# Patient Record
Sex: FEMALE | Race: WHITE | NOT HISPANIC OR LATINO | Employment: OTHER | URBAN - METROPOLITAN AREA
[De-identification: names, ages, dates, MRNs, and addresses within clinical notes are randomized per-mention and may not be internally consistent; named-entity substitution may affect disease eponyms.]

---

## 2023-09-23 ENCOUNTER — HOSPITAL ENCOUNTER (INPATIENT)
Facility: HOSPITAL | Age: 83
LOS: 1 days | Discharge: HOME | End: 2023-09-25
Attending: EMERGENCY MEDICINE | Admitting: INTERNAL MEDICINE
Payer: COMMERCIAL

## 2023-09-23 ENCOUNTER — APPOINTMENT (EMERGENCY)
Dept: RADIOLOGY | Facility: HOSPITAL | Age: 83
End: 2023-09-23
Payer: COMMERCIAL

## 2023-09-23 DIAGNOSIS — R65.10 SIRS (SYSTEMIC INFLAMMATORY RESPONSE SYNDROME) (HCC): ICD-10-CM

## 2023-09-23 DIAGNOSIS — D72.819 LEUKOPENIA: ICD-10-CM

## 2023-09-23 DIAGNOSIS — R50.9 FEVER: Primary | ICD-10-CM

## 2023-09-23 LAB
ALBUMIN SERPL BCP-MCNC: 3.9 G/DL (ref 3.5–5)
ALP SERPL-CCNC: 55 U/L (ref 34–104)
ALT SERPL W P-5'-P-CCNC: 17 U/L (ref 7–52)
AMORPH URATE CRY URNS QL MICRO: ABNORMAL
ANION GAP SERPL CALCULATED.3IONS-SCNC: 6 MMOL/L
APTT PPP: 27 SECONDS (ref 23–37)
AST SERPL W P-5'-P-CCNC: 26 U/L (ref 13–39)
BACTERIA UR QL AUTO: ABNORMAL /HPF
BASOPHILS # BLD AUTO: 0.01 THOUSANDS/ÂΜL (ref 0–0.1)
BASOPHILS NFR BLD AUTO: 1 % (ref 0–1)
BILIRUB SERPL-MCNC: 0.44 MG/DL (ref 0.2–1)
BILIRUB UR QL STRIP: NEGATIVE
BUN SERPL-MCNC: 19 MG/DL (ref 5–25)
CALCIUM SERPL-MCNC: 9.6 MG/DL (ref 8.4–10.2)
CHLORIDE SERPL-SCNC: 101 MMOL/L (ref 96–108)
CLARITY UR: ABNORMAL
CO2 SERPL-SCNC: 27 MMOL/L (ref 21–32)
COLOR UR: YELLOW
CREAT SERPL-MCNC: 0.74 MG/DL (ref 0.6–1.3)
EOSINOPHIL # BLD AUTO: 0.04 THOUSAND/ÂΜL (ref 0–0.61)
EOSINOPHIL NFR BLD AUTO: 2 % (ref 0–6)
ERYTHROCYTE [DISTWIDTH] IN BLOOD BY AUTOMATED COUNT: 12.6 % (ref 11.6–15.1)
GFR SERPL CREATININE-BSD FRML MDRD: 74 ML/MIN/1.73SQ M
GLUCOSE SERPL-MCNC: 121 MG/DL (ref 65–140)
GLUCOSE UR STRIP-MCNC: NEGATIVE MG/DL
HCT VFR BLD AUTO: 38.6 % (ref 34.8–46.1)
HGB BLD-MCNC: 12.7 G/DL (ref 11.5–15.4)
HGB UR QL STRIP.AUTO: ABNORMAL
IMM GRANULOCYTES # BLD AUTO: 0.01 THOUSAND/UL (ref 0–0.2)
IMM GRANULOCYTES NFR BLD AUTO: 1 % (ref 0–2)
INR PPP: 1.08 (ref 0.84–1.19)
KETONES UR STRIP-MCNC: NEGATIVE MG/DL
LACTATE SERPL-SCNC: 0.9 MMOL/L (ref 0.5–2)
LEUKOCYTE ESTERASE UR QL STRIP: NEGATIVE
LYMPHOCYTES # BLD AUTO: 0.25 THOUSANDS/ÂΜL (ref 0.6–4.47)
LYMPHOCYTES NFR BLD AUTO: 13 % (ref 14–44)
MCH RBC QN AUTO: 30.1 PG (ref 26.8–34.3)
MCHC RBC AUTO-ENTMCNC: 32.9 G/DL (ref 31.4–37.4)
MCV RBC AUTO: 92 FL (ref 82–98)
MONOCYTES # BLD AUTO: 0.24 THOUSAND/ÂΜL (ref 0.17–1.22)
MONOCYTES NFR BLD AUTO: 13 % (ref 4–12)
MUCOUS THREADS UR QL AUTO: ABNORMAL
NEUTROPHILS # BLD AUTO: 1.35 THOUSANDS/ÂΜL (ref 1.85–7.62)
NEUTS SEG NFR BLD AUTO: 70 % (ref 43–75)
NITRITE UR QL STRIP: NEGATIVE
NON-SQ EPI CELLS URNS QL MICRO: ABNORMAL /HPF
NRBC BLD AUTO-RTO: 0 /100 WBCS
PH UR STRIP.AUTO: 7 [PH] (ref 4.5–8)
PLATELET # BLD AUTO: 196 THOUSANDS/UL (ref 149–390)
PMV BLD AUTO: 9.3 FL (ref 8.9–12.7)
POTASSIUM SERPL-SCNC: 4.3 MMOL/L (ref 3.5–5.3)
PROCALCITONIN SERPL-MCNC: 8.03 NG/ML
PROT SERPL-MCNC: 6.9 G/DL (ref 6.4–8.4)
PROT UR STRIP-MCNC: ABNORMAL MG/DL
PROTHROMBIN TIME: 14.2 SECONDS (ref 11.6–14.5)
RBC # BLD AUTO: 4.22 MILLION/UL (ref 3.81–5.12)
RBC #/AREA URNS AUTO: ABNORMAL /HPF
SARS-COV-2 RNA RESP QL NAA+PROBE: NEGATIVE
SODIUM SERPL-SCNC: 134 MMOL/L (ref 135–147)
SP GR UR STRIP.AUTO: 1.02 (ref 1–1.03)
UROBILINOGEN UR QL STRIP.AUTO: 0.2 E.U./DL
WBC # BLD AUTO: 1.9 THOUSAND/UL (ref 4.31–10.16)
WBC #/AREA URNS AUTO: ABNORMAL /HPF

## 2023-09-23 PROCEDURE — 85652 RBC SED RATE AUTOMATED: CPT | Performed by: INTERNAL MEDICINE

## 2023-09-23 PROCEDURE — 71045 X-RAY EXAM CHEST 1 VIEW: CPT

## 2023-09-23 PROCEDURE — 1123F ACP DISCUSS/DSCN MKR DOCD: CPT | Performed by: INTERNAL MEDICINE

## 2023-09-23 PROCEDURE — 84145 PROCALCITONIN (PCT): CPT

## 2023-09-23 PROCEDURE — 36415 COLL VENOUS BLD VENIPUNCTURE: CPT

## 2023-09-23 PROCEDURE — 99285 EMERGENCY DEPT VISIT HI MDM: CPT

## 2023-09-23 PROCEDURE — 85610 PROTHROMBIN TIME: CPT

## 2023-09-23 PROCEDURE — 83605 ASSAY OF LACTIC ACID: CPT

## 2023-09-23 PROCEDURE — 80053 COMPREHEN METABOLIC PANEL: CPT

## 2023-09-23 PROCEDURE — 87635 SARS-COV-2 COVID-19 AMP PRB: CPT

## 2023-09-23 PROCEDURE — 85025 COMPLETE CBC W/AUTO DIFF WBC: CPT

## 2023-09-23 PROCEDURE — 81001 URINALYSIS AUTO W/SCOPE: CPT

## 2023-09-23 PROCEDURE — 85730 THROMBOPLASTIN TIME PARTIAL: CPT

## 2023-09-23 PROCEDURE — 93005 ELECTROCARDIOGRAM TRACING: CPT

## 2023-09-23 PROCEDURE — 99285 EMERGENCY DEPT VISIT HI MDM: CPT | Performed by: EMERGENCY MEDICINE

## 2023-09-23 PROCEDURE — 86140 C-REACTIVE PROTEIN: CPT | Performed by: INTERNAL MEDICINE

## 2023-09-23 PROCEDURE — 87040 BLOOD CULTURE FOR BACTERIA: CPT

## 2023-09-23 PROCEDURE — 86618 LYME DISEASE ANTIBODY: CPT

## 2023-09-23 RX ORDER — ACETAMINOPHEN 325 MG/1
975 TABLET ORAL ONCE
Status: COMPLETED | OUTPATIENT
Start: 2023-09-23 | End: 2023-09-23

## 2023-09-23 RX ADMIN — ACETAMINOPHEN 975 MG: 325 TABLET, FILM COATED ORAL at 21:09

## 2023-09-24 ENCOUNTER — APPOINTMENT (INPATIENT)
Dept: RADIOLOGY | Facility: HOSPITAL | Age: 83
End: 2023-09-24
Payer: COMMERCIAL

## 2023-09-24 PROBLEM — R65.10 SIRS (SYSTEMIC INFLAMMATORY RESPONSE SYNDROME) (HCC): Status: ACTIVE | Noted: 2023-09-24

## 2023-09-24 PROBLEM — D72.819 LEUKOPENIA: Status: ACTIVE | Noted: 2023-09-24

## 2023-09-24 LAB
ANION GAP SERPL CALCULATED.3IONS-SCNC: 5 MMOL/L
ATRIAL RATE: 68 BPM
B BURGDOR IGG+IGM SER QL IA: NEGATIVE
BASOPHILS # BLD AUTO: 0.01 THOUSANDS/ÂΜL (ref 0–0.1)
BASOPHILS NFR BLD AUTO: 1 % (ref 0–1)
BUN SERPL-MCNC: 21 MG/DL (ref 5–25)
CALCIUM SERPL-MCNC: 9 MG/DL (ref 8.4–10.2)
CHLORIDE SERPL-SCNC: 103 MMOL/L (ref 96–108)
CO2 SERPL-SCNC: 28 MMOL/L (ref 21–32)
CREAT SERPL-MCNC: 0.91 MG/DL (ref 0.6–1.3)
CRP SERPL QL: 52.4 MG/L
EOSINOPHIL # BLD AUTO: 0.13 THOUSAND/ÂΜL (ref 0–0.61)
EOSINOPHIL NFR BLD AUTO: 6 % (ref 0–6)
ERYTHROCYTE [DISTWIDTH] IN BLOOD BY AUTOMATED COUNT: 12.8 % (ref 11.6–15.1)
ERYTHROCYTE [SEDIMENTATION RATE] IN BLOOD: 31 MM/HOUR (ref 0–29)
FLUAV RNA RESP QL NAA+PROBE: NEGATIVE
FLUBV RNA RESP QL NAA+PROBE: NEGATIVE
GFR SERPL CREATININE-BSD FRML MDRD: 57 ML/MIN/1.73SQ M
GLUCOSE SERPL-MCNC: 94 MG/DL (ref 65–140)
HCT VFR BLD AUTO: 38.3 % (ref 34.8–46.1)
HGB BLD-MCNC: 12.4 G/DL (ref 11.5–15.4)
IMM GRANULOCYTES # BLD AUTO: 0.01 THOUSAND/UL (ref 0–0.2)
IMM GRANULOCYTES NFR BLD AUTO: 1 % (ref 0–2)
LYMPHOCYTES # BLD AUTO: 0.85 THOUSANDS/ÂΜL (ref 0.6–4.47)
LYMPHOCYTES NFR BLD AUTO: 38 % (ref 14–44)
MCH RBC QN AUTO: 30 PG (ref 26.8–34.3)
MCHC RBC AUTO-ENTMCNC: 32.4 G/DL (ref 31.4–37.4)
MCV RBC AUTO: 93 FL (ref 82–98)
MONOCYTES # BLD AUTO: 0.41 THOUSAND/ÂΜL (ref 0.17–1.22)
MONOCYTES NFR BLD AUTO: 19 % (ref 4–12)
NEUTROPHILS # BLD AUTO: 0.75 THOUSANDS/ÂΜL (ref 1.85–7.62)
NEUTS SEG NFR BLD AUTO: 35 % (ref 43–75)
NRBC BLD AUTO-RTO: 0 /100 WBCS
P AXIS: 51 DEGREES
PLATELET # BLD AUTO: 178 THOUSANDS/UL (ref 149–390)
PMV BLD AUTO: 9.3 FL (ref 8.9–12.7)
POTASSIUM SERPL-SCNC: 3.7 MMOL/L (ref 3.5–5.3)
PR INTERVAL: 172 MS
QRS AXIS: 0 DEGREES
QRSD INTERVAL: 82 MS
QT INTERVAL: 384 MS
QTC INTERVAL: 408 MS
RBC # BLD AUTO: 4.14 MILLION/UL (ref 3.81–5.12)
RSV RNA RESP QL NAA+PROBE: NEGATIVE
SARS-COV-2 RNA RESP QL NAA+PROBE: NEGATIVE
SODIUM SERPL-SCNC: 136 MMOL/L (ref 135–147)
T WAVE AXIS: 45 DEGREES
VENTRICULAR RATE: 68 BPM
WBC # BLD AUTO: 2.16 THOUSAND/UL (ref 4.31–10.16)

## 2023-09-24 PROCEDURE — 85025 COMPLETE CBC W/AUTO DIFF WBC: CPT | Performed by: INTERNAL MEDICINE

## 2023-09-24 PROCEDURE — 71260 CT THORAX DX C+: CPT

## 2023-09-24 PROCEDURE — 74177 CT ABD & PELVIS W/CONTRAST: CPT

## 2023-09-24 PROCEDURE — G1004 CDSM NDSC: HCPCS

## 2023-09-24 PROCEDURE — 80048 BASIC METABOLIC PNL TOTAL CA: CPT | Performed by: INTERNAL MEDICINE

## 2023-09-24 PROCEDURE — 0241U HB NFCT DS VIR RESP RNA 4 TRGT: CPT | Performed by: INTERNAL MEDICINE

## 2023-09-24 PROCEDURE — 93010 ELECTROCARDIOGRAM REPORT: CPT | Performed by: INTERNAL MEDICINE

## 2023-09-24 PROCEDURE — 99223 1ST HOSP IP/OBS HIGH 75: CPT | Performed by: INTERNAL MEDICINE

## 2023-09-24 PROCEDURE — 96374 THER/PROPH/DIAG INJ IV PUSH: CPT

## 2023-09-24 RX ORDER — ZOLPIDEM TARTRATE 5 MG/1
5 TABLET ORAL
Status: DISCONTINUED | OUTPATIENT
Start: 2023-09-24 | End: 2023-09-25 | Stop reason: HOSPADM

## 2023-09-24 RX ORDER — ONDANSETRON 2 MG/ML
4 INJECTION INTRAMUSCULAR; INTRAVENOUS EVERY 6 HOURS PRN
Status: DISCONTINUED | OUTPATIENT
Start: 2023-09-24 | End: 2023-09-25 | Stop reason: HOSPADM

## 2023-09-24 RX ORDER — PHENOL 1.4 %
1 AEROSOL, SPRAY (ML) MUCOUS MEMBRANE EVERY MORNING
COMMUNITY

## 2023-09-24 RX ORDER — MELATONIN
2000 DAILY
Status: DISCONTINUED | OUTPATIENT
Start: 2023-09-24 | End: 2023-09-25 | Stop reason: HOSPADM

## 2023-09-24 RX ORDER — ENOXAPARIN SODIUM 100 MG/ML
40 INJECTION SUBCUTANEOUS DAILY
Status: DISCONTINUED | OUTPATIENT
Start: 2023-09-24 | End: 2023-09-25 | Stop reason: HOSPADM

## 2023-09-24 RX ORDER — ZOLPIDEM TARTRATE 5 MG/1
5 TABLET ORAL DAILY PRN
COMMUNITY
Start: 2023-07-26

## 2023-09-24 RX ORDER — CALCIUM CARBONATE 500(1250)
1 TABLET ORAL
Status: DISCONTINUED | OUTPATIENT
Start: 2023-09-24 | End: 2023-09-25 | Stop reason: HOSPADM

## 2023-09-24 RX ORDER — BIOTIN 1 MG
1000 TABLET ORAL
COMMUNITY

## 2023-09-24 RX ORDER — ACETAMINOPHEN 325 MG/1
650 TABLET ORAL EVERY 6 HOURS PRN
Status: DISCONTINUED | OUTPATIENT
Start: 2023-09-24 | End: 2023-09-25 | Stop reason: HOSPADM

## 2023-09-24 RX ADMIN — ZOLPIDEM TARTRATE 5 MG: 5 TABLET ORAL at 01:26

## 2023-09-24 RX ADMIN — Medication 1 TABLET: at 08:00

## 2023-09-24 RX ADMIN — Medication 2000 UNITS: at 08:00

## 2023-09-24 RX ADMIN — ZOLPIDEM TARTRATE 5 MG: 5 TABLET ORAL at 20:57

## 2023-09-24 RX ADMIN — ENOXAPARIN SODIUM 40 MG: 40 INJECTION SUBCUTANEOUS at 08:00

## 2023-09-24 RX ADMIN — ACETAMINOPHEN 650 MG: 325 TABLET, FILM COATED ORAL at 08:00

## 2023-09-24 RX ADMIN — CEFTRIAXONE SODIUM 1000 MG: 10 INJECTION, POWDER, FOR SOLUTION INTRAVENOUS at 00:09

## 2023-09-24 RX ADMIN — IOHEXOL 100 ML: 350 INJECTION, SOLUTION INTRAVENOUS at 00:49

## 2023-09-24 NOTE — ED PROVIDER NOTES
History  Chief Complaint   Patient presents with   • Fever     Pt coming from [facility] dementia unit. Reporting fever all day and body aches, fever of 103 before tylenol. 102.3 for EMS. Last had tylenol at 7am. No known sick contacts. Also c/o nausea and headache     78year-old woman with relevant past medical history of breast cancer and hyperlipidemia presents with fever and arthralgias. Patient was in her normal state of health yesterday when she awoke today with diffuse arthralgias of the upper and lower extremities. She tried taking AN Advil for the pain, but then had an episode of small vomiting which she claims is secondary to taking an empty stomach. She has not vomited since. She asked her nurse at the [facility] for Tylenol, but she never was given any. She also reports having a fever of unknown number. She is denying cough, sore throat, abdominal pain, dysuria, diarrhea, or constipation. She does go outside to golf. She last golfed on Tuesday. She did not notice any tick bites. Prior to Admission Medications   Prescriptions Last Dose Informant Patient Reported? Taking? Biotin 1000 MCG tablet   Yes No   Sig: Take 1,000 mg by mouth   Cholecalciferol 50 MCG (2000 UT) CAPS   Yes No   Sig: Take 1 capsule by mouth every morning   calcium carbonate (OS-AFTUMA) 600 MG tablet   Yes No   Sig: Take 1 tablet by mouth every morning   vitamin A 3 MG (10751 UT) capsule   Yes No   Sig: Take 10,000 Units by mouth   zolpidem (AMBIEN) 5 mg tablet   Yes Yes   Sig: Take 5 mg by mouth daily as needed      Facility-Administered Medications: None       Past Medical History:   Diagnosis Date   • Breast cancer ([address] [facility])    • Dementia ([address] [facility])    • Hyperlipidemia    • Insomnia        History reviewed. No pertinent surgical history. History reviewed. No pertinent family history. I have reviewed and agree with the history as documented.     E-Cigarette/Vaping     E-Cigarette/Vaping Substances     Social History     Tobacco Use • Smoking status: Never   • Smokeless tobacco: Never   Substance Use Topics   • Alcohol use: Never   • Drug use: Never        Review of Systems   Constitutional: Positive for fever. Negative for chills. HENT: Negative for ear pain and sore throat. Eyes: Negative for pain and visual disturbance. Respiratory: Negative for cough, shortness of breath and wheezing. Cardiovascular: Negative for chest pain and palpitations. Gastrointestinal: Negative for abdominal pain, constipation and diarrhea. Genitourinary: Negative for dysuria, frequency, hematuria and vaginal bleeding. Musculoskeletal: Positive for arthralgias. Negative for back pain. Skin: Negative for color change and rash. Neurological: Negative for seizures, syncope and headaches. Psychiatric/Behavioral: Negative for agitation and confusion. Physical Exam  ED Triage Vitals   Temperature Pulse Respirations Blood Pressure SpO2   09/23/23 2054 09/23/23 2052 09/23/23 2052 09/23/23 2054 09/23/23 2052   99.2 °F (37.3 °C) 69 18 149/65 94 %      Temp Source Heart Rate Source Patient Position - Orthostatic VS BP Location FiO2 (%)   09/23/23 2054 09/23/23 2052 09/23/23 2054 09/23/23 2054 --   Oral Monitor Lying Right arm       Pain Score       09/23/23 2230       No Pain             Orthostatic Vital Signs  Vitals:    09/23/23 2300 09/24/23 0030 09/24/23 0112 09/24/23 0807   BP:   (!) 115/49 120/59   Pulse: 58 56 62 67   Patient Position - Orthostatic VS:    Lying       Physical Exam  Vitals and nursing note reviewed. Constitutional:       General: She is not in acute distress. Appearance: Normal appearance. She is well-developed. She is not ill-appearing or toxic-appearing. HENT:      Head: Normocephalic and atraumatic. Right Ear: External ear normal.      Left Ear: External ear normal.      Nose: Nose normal. No congestion. Mouth/Throat:      Mouth: Mucous membranes are moist.      Pharynx: Oropharynx is clear.    Eyes: Extraocular Movements: Extraocular movements intact. Pupils: Pupils are equal, round, and reactive to light. Cardiovascular:      Rate and Rhythm: Normal rate and regular rhythm. Pulses: Normal pulses. Pulmonary:      Effort: Pulmonary effort is normal. No respiratory distress. Breath sounds: Normal breath sounds. No stridor. No wheezing, rhonchi or rales. Abdominal:      Palpations: Abdomen is soft. Tenderness: There is no abdominal tenderness. There is no guarding or rebound. Musculoskeletal:         General: No tenderness or deformity. Normal range of motion. Cervical back: Normal range of motion and neck supple. No rigidity or tenderness. Right lower leg: No edema. Left lower leg: No edema. Skin:     General: Skin is warm and dry. Capillary Refill: Capillary refill takes less than 2 seconds. Neurological:      Mental Status: She is alert and oriented to person, place, and time. Mental status is at baseline. Motor: No weakness. Comments: Awake alert and oriented to person, place, situation, and time. 5 out of 5 strength in the upper and lower extremities bilaterally.    Psychiatric:         Mood and Affect: Mood normal.         Behavior: Behavior normal.         ED Medications  Medications   calcium carbonate (OYSTER SHELL,OSCAL) 500 mg tablet 1 tablet (1 tablet Oral Given 9/24/23 0800)   cholecalciferol (VITAMIN D3) tablet 2,000 Units (2,000 Units Oral Given 9/24/23 0800)   zolpidem (AMBIEN) tablet 5 mg (5 mg Oral Given 9/24/23 0126)   ondansetron (ZOFRAN) injection 4 mg (has no administration in time range)   acetaminophen (TYLENOL) tablet 650 mg (650 mg Oral Given 9/24/23 0800)   enoxaparin (LOVENOX) subcutaneous injection 40 mg (40 mg Subcutaneous Given 9/24/23 0800)   acetaminophen (TYLENOL) tablet 975 mg (975 mg Oral Given 9/23/23 2109)   ceftriaxone (ROCEPHIN) 1 g/50 mL in dextrose IVPB (0 mg Intravenous Stopped 9/24/23 0055)   iohexol (OMNIPAQUE) 350 MG/ML injection (MULTI-DOSE) 100 mL (100 mL Intravenous Given 9/24/23 0049)       Diagnostic Studies  Results Reviewed     Procedure Component Value Units Date/Time    Lyme Total AB W Reflex to IGM/IGG [[id number]]  (Normal) Collected: 09/23/23 2340    Lab Status: Final result Specimen: Blood from Arm, Left Updated: 09/24/23 1250     Lyme Total Antibodies Negative    Blood culture #1 [[id number]] Collected: 09/23/23 2146    Lab Status: Preliminary result Specimen: Blood from Hand, Right Updated: 09/24/23 0901     Blood Culture Received in Microbiology Lab. Culture in Progress. Blood culture #2 [[id number]] Collected: 09/23/23 2133    Lab Status: Preliminary result Specimen: Blood from Arm, Left Updated: 09/24/23 0101     Blood Culture Received in Microbiology Lab. Culture in Progress.     C-reactive protein [[id number]]  (Abnormal) Collected: 09/23/23 2111    Lab Status: Final result Specimen: Blood from Arm, Left Updated: 09/24/23 0026     CRP 52.4 mg/L     Sedimentation rate, automated [[id number]]  (Abnormal) Collected: 09/23/23 2111    Lab Status: Final result Specimen: Blood from Arm, Left Updated: 09/24/23 0007     Sed Rate 31 mm/hour     Procalcitonin [[id number]]  (Abnormal) Collected: 09/23/23 2111    Lab Status: Final result Specimen: Blood from Arm, Left Updated: 09/23/23 2318     Procalcitonin 8.03 ng/ml     Urine Microscopic [[id number]]  (Abnormal) Collected: 09/23/23 2238    Lab Status: Final result Specimen: Urine, Clean Catch Updated: 09/23/23 2314     RBC, UA 10-20 /hpf      WBC, UA 1-2 /hpf      Epithelial Cells Occasional /hpf      Bacteria, UA Occasional /hpf      MUCUS THREADS Occasional     Amorphous Crystals, UA Occasional    COVID only [[id number]]  (Normal) Collected: 09/23/23 2111    Lab Status: Final result Specimen: Nares from Nose Updated: 09/23/23 2242     SARS-CoV-2 Negative    Narrative:      FOR PEDIATRIC PATIENTS - copy/paste COVID Guidelines URL to browser: [email or web address]    SARS-CoV-2 assay is a Nucleic Acid Amplification assay intended for the  qualitative detection of nucleic acid from SARS-CoV-2 in nasopharyngeal  swabs. Results are for the presumptive identification of SARS-CoV-2 RNA. Positive results are indicative of infection with SARS-CoV-2, the virus  causing COVID-19, but do not rule out bacterial infection or co-infection  with other viruses. Laboratories within the [facility] and its  territories are required to report all positive results to the appropriate  public health authorities. Negative results do not preclude SARS-CoV-2  infection and should not be used as the sole basis for treatment or other  patient management decisions. Negative results must be combined with  clinical observations, patient history, and epidemiological information. This test has not been FDA cleared or approved. This test has been authorized by FDA under an Emergency Use Authorization  (EUA). This test is only authorized for the duration of time the  declaration that circumstances exist justifying the authorization of the  emergency use of an in vitro diagnostic tests for detection of SARS-CoV-2  virus and/or diagnosis of COVID-19 infection under section 564(b)(1) of  the Act, 21 U. S.C. 259PIO-1(L)(7), unless the authorization is terminated  or revoked sooner. The test has been validated but independent review by FDA  and CLIA is pending. Test performed using [employer] Gene[employer]pert: This RT-PCR assay targets N2,  a region unique to SARS-CoV-2. A conserved region in the E-gene was chosen  for pan-Sarbecovirus detection which includes SARS-CoV-2. According to CMS-2020-01-R, this platform meets the definition of high-throughput technology.     Urine Macroscopic, POC [[id number]]  (Abnormal) Collected: 09/23/23 2238    Lab Status: Final result Specimen: Urine Updated: 09/23/23 2240     Color, UA Yellow Clarity, UA Cloudy     pH, UA 7.0     Leukocytes, UA Negative     Nitrite, UA Negative     Protein, UA 30 (1+) mg/dl      Glucose, UA Negative mg/dl      Ketones, UA Negative mg/dl      Urobilinogen, UA 0.2 E.U./dl      Bilirubin, UA Negative     Occult Blood, UA Large     Specific Gravity, UA 1.020    Narrative:      CLINITEK RESULT    Protime-INR [[id number]]  (Normal) Collected: 09/23/23 2133    Lab Status: Final result Specimen: Blood from Arm, Left Updated: 09/23/23 2214     Protime 14.2 seconds      INR 1.08    APTT [[id number]]  (Normal) Collected: 09/23/23 2133    Lab Status: Final result Specimen: Blood from Arm, Left Updated: 09/23/23 2214     PTT 27 seconds     Lactic acid, plasma (w/reflex if result > 2.0) [[id number]]  (Normal) Collected: 09/23/23 2133    Lab Status: Final result Specimen: Blood from Arm, Left Updated: 09/23/23 2203     LACTIC ACID 0.9 mmol/L     Narrative:      Result may be elevated if tourniquet was used during collection. CBC and differential [[id number]]  (Abnormal) Collected: 09/23/23 2111    Lab Status: Final result Specimen: Blood from Arm, Left Updated: 09/23/23 2202     WBC 1.90 Thousand/uL      RBC 4.22 Million/uL      Hemoglobin 12.7 g/dL      Hematocrit 38.6 %      MCV 92 fL      MCH 30.1 pg      MCHC 32.9 g/dL      RDW 12.6 %      MPV 9.3 fL      Platelets 437 Thousands/uL      nRBC 0 /100 WBCs      Neutrophils Relative 70 %      Immat GRANS % 1 %      Lymphocytes Relative 13 %      Monocytes Relative 13 %      Eosinophils Relative 2 %      Basophils Relative 1 %      Neutrophils Absolute 1.35 Thousands/µL      Immature Grans Absolute 0.01 Thousand/uL      Lymphocytes Absolute 0.25 Thousands/µL      Monocytes Absolute 0.24 Thousand/µL      Eosinophils Absolute 0.04 Thousand/µL      Basophils Absolute 0.01 Thousands/µL     Narrative: This is an appended report. These results have been appended to a previously verified report.     Comprehensive metabolic panel [[id number]]  (Abnormal) Collected: 09/23/23 2111    Lab Status: Final result Specimen: Blood from Arm, Left Updated: 09/23/23 2139     Sodium 134 mmol/L      Potassium 4.3 mmol/L      Chloride 101 mmol/L      CO2 27 mmol/L      ANION GAP 6 mmol/L      BUN 19 mg/dL      Creatinine 0.74 mg/dL      Glucose 121 mg/dL      Calcium 9.6 mg/dL      AST 26 U/L      ALT 17 U/L      Alkaline Phosphatase 55 U/L      Total Protein 6.9 g/dL      Albumin 3.9 g/dL      Total Bilirubin 0.44 mg/dL      eGFR 74 ml/min/1.73sq m     Narrative:      [place] guidelines for Chronic Kidney Disease (CKD):   •  Stage 1 with normal or high GFR (GFR > 90 mL/min/1.73 square meters)  •  Stage 2 Mild CKD (GFR = 60-89 mL/min/1.73 square meters)  •  Stage 3A Moderate CKD (GFR = 45-59 mL/min/1.73 square meters)  •  Stage 3B Moderate CKD (GFR = 30-44 mL/min/1.73 square meters)  •  Stage 4 Severe CKD (GFR = 15-29 mL/min/1.73 square meters)  •  Stage 5 End Stage CKD (GFR <15 mL/min/1.73 square meters)  Note: GFR calculation is accurate only with a steady state creatinine                 CT chest abdomen pelvis w contrast   Final Result by Ella Banerjee DO (09/24 7502)      Mild circumferential bladder wall thickening, consider a cystitis in the appropriate clinical setting. Correlation with the patient's symptoms, laboratory values, and urinalysis recommended. No acute process seen in the chest.      Coronary atherosclerosis, postsurgical changes in the left breast and axillary region, renal cysts, colonic diverticulosis without evidence of acute diverticulitis, small hiatal hernia suspected, and other findings as above. The study was marked in [facility] for immediate notification. Workstation performed: [id number]         XR chest portable   ED Interpretation by Lilia Grewal MD (09/23 2225)   No acute cardiopulmonary disease.             Procedures  Procedures      ED Course  ED Course as of 09/24/23 1336   [place] Sep 24, 2023   0002 Procalcitonin(!): 8.03   0002 WBC(!!): 1.90         Medical Decision Making  Presents with fever and arthralgias. Patient does not have a fever here and she is not tachycardic. She is otherwise well-appearing. Will check labs, chest x-ray, COVID test, and urine. DDx: At risk for pneumonia, COVID, urinary tract infection, or other viral infection    She has new leukocytosis, which upon review of outside records she has no history of. No evidence of pneumonia, urinary tract infection, or COVID. Her procalcitonin is significantly elevated. I do not have a great source for this at this time. Will start ceftriaxone and admit for observation of blood cultures given elevated procalcitonin and leukopenia. I did send Lyme testing given that she golfs and is having fever and arthralgias. Patient in agreement with plan and questions were answered. Portions or all of this note were generated using voice recognition software. Occasional wrong word or "sound a like" substitutions may have occurred due to the inherent limitations of voice recognition software. Please interpret any errors within the intended context of the whole sentence or idea. Amount and/or Complexity of Data Reviewed  External Data Reviewed: labs. Labs: ordered. Decision-making details documented in ED Course. Radiology: ordered and independent interpretation performed. Risk  OTC drugs. Decision regarding hospitalization.             Disposition  Final diagnoses:   Fever   Leukopenia     Time reflects when diagnosis was documented in both MDM as applicable and the Disposition within this note     Time User Action Codes Description Comment    9/23/2023 11:57 PM Anthony Barahona [R50.9] Fever     9/24/2023  8:58 AM Tavon Ojeda Add [R65.10] SIRS vs sepsis ([address])     9/24/2023  1:36 PM Evgeny Amezquita Add [D72.819] Leukopenia       ED Disposition     ED Disposition   Admit    Condition Stable    Date/Time   Sat Sep 23, 2023 11:48 PM    Comment   Case was discussed with JEZ and the patient's admission status was agreed to be Admission Status: inpatient status to the service of Dr. Karen Whittington. Follow-up Information    None         Current Discharge Medication List      CONTINUE these medications which have NOT CHANGED    Details   zolpidem (AMBIEN) 5 mg tablet Take 5 mg by mouth daily as needed      Biotin 1000 MCG tablet Take 1,000 mg by mouth      calcium carbonate (OS-FATUMA) 600 MG tablet Take 1 tablet by mouth every morning      Cholecalciferol 50 MCG (2000 UT) CAPS Take 1 capsule by mouth every morning      vitamin A 3 MG (61209 UT) capsule Take 10,000 Units by mouth           No discharge procedures on file. PDMP Review       Value Time User    PDMP Reviewed  Yes 9/24/2023 12:25 AM Israel Arango DO           ED Provider  Attending physically available and evaluated Jenn Lewis. I managed the patient along with the ED Attending.     Electronically Signed by         Luke Peralta MD  09/24/23 1472       Luke Peralta MD  09/24/23 9185

## 2023-09-24 NOTE — ASSESSMENT & PLAN NOTE
· Leukopenia and reported prehospital fever to 102 °F, additionally with elevated procalcitonin 8.03 noted  · Potential infectious source unclear at this time: 6 heart wet read without acute cardiopulmonary disease visualized, COVID-19 PCR negative, UA not consistent with UTI.  ESR/CRP elevations 31/52.4 noted  · Consideration for tickborne disease given outdoor activity of golfing versus bacteremia not identified versus viral illness  · Received dose of ceftriaxone during ED evaluation, observe off further antibiotics unless patient deteriorates pending the results of blood cultures x2 and Lyme testing  · Follow-up results of CT chest/abdomen/pelvis  · Trend procalcitonin, WBC, temperature curve, hemodynamics  · Supportive care otherwise as appropriate

## 2023-09-24 NOTE — ASSESSMENT & PLAN NOTE
· Leukopenia with mild neutropenia ANC 1350, patient additionally reporting fever prior to hospital  · No clear source of infection at this time, plan as per primary problem.   Monitor counts with CBC for now, can consider further noninfectious work-up if no infectious source identified and leukopenia should persist

## 2023-09-24 NOTE — ED NOTES
Pt verbalized understanding of need for urine sample.  Will notify RN when she needs to urinate     Reyna Lerma RN  09/23/23 4013

## 2023-09-24 NOTE — QUICK NOTE
Patient was evaluated this morning. She is doing well. No fevers. She was asking to go home. I did explain to her that her blood cultures are still pending as part of the work-up and this will not be resulted until later tonight or tomorrow morning. First she asked to be discharged home today and I did discuss with her that we can do that however if her blood cultures came back positive then I will be calling her back again so she can return to the ER. Patient eventually decided to stay tonight and if her blood cultures are negative we will discharge her first thing in the morning. Recheck CBC tomorrow morning. Appreciate ID evaluation.

## 2023-09-24 NOTE — UTILIZATION REVIEW
Initial Clinical Review    Admission: Date/Time/Statement:   Admission Orders (From admission, onward)     Ordered        09/24/23 0015  INPATIENT ADMISSION  Once                      Orders Placed This Encounter   Procedures   • INPATIENT ADMISSION     Standing Status:   Standing     Number of Occurrences:   1     Order Specific Question:   Level of Care     Answer:   Med Surg [16]     Order Specific Question:   Estimated length of stay     Answer:   More than 2 Midnights     Order Specific Question:   Certification     Answer:   I certify that inpatient services are medically necessary for this patient for a duration of greater than two midnights. See H&P and MD Progress Notes for additional information about the patient's course of treatment. ED Arrival Information     Expected   -    Arrival   9/23/2023 20:46    Acuity   Urgent            Means of arrival   Ambulance    Escorted by   [facility]    Service   Hospitalist    Admission type   Emergency            Arrival complaint   Fever           Chief Complaint   Patient presents with   • Fever     Pt coming from [facility] dementia unit. Reporting fever all day and body aches, fever of 103 before tylenol. 102.3 for EMS. Last had tylenol at 7am. No known sick contacts. Also c/o nausea and headache       Initial Presentation: 78 y.o. female who presented to [address] [place] [address] ED via EMS due to fevers and arthralgias throughout the BL upper and lower extremities without isolated joint pain. She attempted to take a dose of Advil for the pain but had episode of vomiting, later in the day developing reported fever to 102.3 °F which prompted presentation here. In the ED, labs revealing leukopenia to 1.9 and elevated procalcitonin to 8.03. Blood work-up for source was initiated with CXR to wet read without acute cardiopulmonary disease visualized. Given IV ceftriaxone in ED. PMHx:  Breast Ca w/p resection and XRT, insomnia.    Plan:  Admit to Inpatient status dt SIRS vs Sepsis. Med surg, continue IV ABX, fu on cxs and Lyme test, consult ID, fu CT CAP, trend procal, wbc and temp. Date: 9/24   Day 2:  Per ID: continue to follow closely off ABX, fu on final blood cxs, monitor temp closely, recheck CBC in 1 wk. Date: 9/25    Day 3: Has surpassed a 2nd midnight with active treatments and services, which include labs, fu on cxs, trend temp.     ED Triage Vitals   Temperature Pulse Respirations Blood Pressure SpO2   09/23/23 2054 09/23/23 2052 09/23/23 2052 09/23/23 2054 09/23/23 2052   99.2 °F (37.3 °C) 69 18 149/65 94 %      Temp Source Heart Rate Source Patient Position - Orthostatic VS BP Location FiO2 (%)   09/23/23 2054 09/23/23 2052 09/23/23 2054 09/23/23 2054 --   Oral Monitor Lying Right arm       Pain Score       09/23/23 2230       No Pain          Wt Readings from Last 1 Encounters:   09/24/23 57.1 kg (125 lb 14.1 oz)     Additional Vital Signs:   Date/Time Temp Pulse Resp BP MAP (mmHg) SpO2 O2 Device Patient Position - Orthostatic VS   09/25/23 0755 97.4 °F (36.3 °C) Abnormal  58 16 151/74 -- 97 % None (Room air) Lying   09/24/23 22:47:26 97.5 °F (36.4 °C) 53 Abnormal  16 136/69 91 96 % -- --   09/24/23 15:20:22 97.9 °F (36.6 °C) 51 Abnormal  16 127/62 84 93 % -- Lying   09/24/23 08:07:04 97.2 °F (36.2 °C) Abnormal  67 12 120/59 79 96 % None (Room air) Lying   09/24/23 0800 -- -- -- -- -- -- None (Room air) --   09/24/23 0115 -- -- -- -- -- -- None (Room air) --   09/24/23 01:12:16 98 °F (36.7 °C) 62 20 115/49 Abnormal  71 93 % -- --   09/24/23 0030 -- 56 18 -- -- 93 % None (Room air) --   09/23/23 2300 -- 58 17 -- -- 97 % None (Room air) --   09/23/23 2200 -- 68 18 139/62 89 93 % None (Room air) --   09/23/23 2100 -- 66 17 153/71 102 95 % None (Room air) Lying   09/23/23 2055 -- -- -- -- -- -- None (Room air) --   09/23/23 2054 99.2 °F (37.3 °C) -- -- 149/65 94 -- -- Lying   09/23/23 2052 -- 69 18 -- -- 94 % None (Room air) --     Pertinent Labs/Diagnostic Test Results:   CT chest abdomen pelvis w contrast   Final Result by Ryan Steiner DO (09/24 4562)      Mild circumferential bladder wall thickening, consider a cystitis in the appropriate clinical setting. Correlation with the patient's symptoms, laboratory values, and urinalysis recommended. No acute process seen in the chest.      Coronary atherosclerosis, postsurgical changes in the left breast and axillary region, renal cysts, colonic diverticulosis without evidence of acute diverticulitis, small hiatal hernia suspected, and other findings as above. The study was marked in [facility] for immediate notification. Workstation performed: [id number]         XR chest portable   ED Interpretation by Lyly Casillas MD (09/23 2225)   No acute cardiopulmonary disease. Final Result by Viktoria Phillips MD (09/24 1346)      No acute cardiopulmonary disease.                   Workstation performed: [id number]           Results from last 7 days   Lab Units 09/24/23  1331 09/23/23 2111   SARS-COV-2  Negative Negative     Results from last 7 days   Lab Units 09/25/23  0445 09/24/23  0527 09/23/23 2111   WBC Thousand/uL 3.03* 2.16* 1.90*   HEMOGLOBIN g/dL 13.2 12.4 12.7   HEMATOCRIT % 40.1 38.3 38.6   PLATELETS Thousands/uL 195 178 196   NEUTROS ABS Thousands/µL 0.72* 0.75* 1.35*         Results from last 7 days   Lab Units 09/24/23  0527 09/23/23 2111   SODIUM mmol/L 136 134*   POTASSIUM mmol/L 3.7 4.3   CHLORIDE mmol/L 103 101   CO2 mmol/L 28 27   ANION GAP mmol/L 5 6   BUN mg/dL 21 19   CREATININE mg/dL 0.91 0.74   EGFR ml/min/1.73sq m 57 74   CALCIUM mg/dL 9.0 9.6     Results from last 7 days   Lab Units 09/23/23  2111   AST U/L 26   ALT U/L 17   ALK PHOS U/L 55   TOTAL PROTEIN g/dL 6.9   ALBUMIN g/dL 3.9   TOTAL BILIRUBIN mg/dL 0.44         Results from last 7 days   Lab Units 09/24/23  0527 09/23/23 2111   GLUCOSE RANDOM mg/dL 94 121     Results from last 7 days   Lab Units 09/23/23 2133   PROTIME seconds 14.2   INR  1.08   PTT seconds 27         Results from last 7 days   Lab Units 09/23/23 2111   PROCALCITONIN ng/ml 8.03*     Results from last 7 days   Lab Units 09/23/23 2133   LACTIC ACID mmol/L 0.9     Results from last 7 days   Lab Units 09/23/23 2111   CRP mg/L 52.4*   SED RATE mm/hour 31*     Results from last 7 days   Lab Units 09/23/23 2238   CLARITY UA  Cloudy   COLOR UA  Yellow   SPEC GRAV UA  1.020   PH UA  7.0   GLUCOSE UA mg/dl Negative   KETONES UA mg/dl Negative   BLOOD UA  Large*   PROTEIN UA mg/dl 30 (1+)*   NITRITE UA  Negative   BILIRUBIN UA  Negative   UROBILINOGEN UA E.U./dl 0.2   LEUKOCYTES UA  Negative   WBC UA /hpf 1-2   RBC UA /hpf 10-20*   BACTERIA UA /hpf Occasional   EPITHELIAL CELLS WET PREP /hpf Occasional   MUCUS THREADS  Occasional*     Results from last 7 days   Lab Units 09/24/23  1331   INFLUENZA A PCR  Negative   INFLUENZA B PCR  Negative   RSV PCR  Negative     Results from last 7 days   Lab Units 09/23/23 2146 09/23/23 2133   BLOOD CULTURE  No Growth at 24 hrs. No Growth at 24 hrs.      ED Treatment:   Medication Administration from 09/23/2023 2046 to 09/24/2023 0108       Date/Time Order Dose Route Action     09/23/2023 2109 EDT acetaminophen (TYLENOL) tablet 975 mg 975 mg Oral Given     09/24/2023 0009 EDT ceftriaxone (ROCEPHIN) 1 g/50 mL in dextrose IVPB 1,000 mg Intravenous New Bag     09/24/2023 0049 EDT iohexol (OMNIPAQUE) 350 MG/ML injection (MULTI-DOSE) 100 mL 100 mL Intravenous Given        Past Medical History:   Diagnosis Date   • Breast cancer ([address])    • Dementia ([address])    • Hyperlipidemia    • Insomnia      Present on Admission:  • SIRS (systemic inflammatory response syndrome) (HCC)  • Insomnia  • Leukopenia      Admitting Diagnosis: Fever [R50.9]  Age/Sex: 78 y.o. female  Admission Orders:  Scheduled Medications:  calcium carbonate, 1 tablet, Oral, Daily With Breakfast  cholecalciferol, 2,000 Units, Oral, Daily  enoxaparin, 40 mg, Subcutaneous, Daily      Continuous IV Infusions: none     PRN Meds:  acetaminophen, 650 mg, Oral, Q6H PRN  ondansetron, 4 mg, Intravenous, Q6H PRN  zolpidem, 5 mg, Oral, HS PRN      scd  IP CONSULT TO INFECTIOUS DISEASES    Network Utilization Review Department  ATTENTION: Please call with any questions or concerns to [phone] and carefully listen to the prompts so that you are directed to the right person. All voicemails are confidential.  Debora Gonzalze all requests for admission clinical reviews, approved or denied determinations and any other requests to dedicated fax number below belonging to the campus where the patient is receiving treatment.  List of dedicated fax numbers for the Facilities:  [place] DENIALS (Administrative/Medical Necessity) [phone] [address] [facility] (Maternity/NICU/Pediatrics) [phone]   [address] [place] [address] Drive [phone]   [facility] [address] [facility] [phone]   [address] [facility] 207 [facility] [id number] [place] [address] [phone]   [address] [facility] [address] [facility] [address] [phone]

## 2023-09-24 NOTE — CONSULTS
Consultation - Infectious Disease   Mulugeta Nance 78 y.o. female MRN: [id number]  Unit/Bed#: [facility] 507-01 Encounter: [id number]      IMPRESSION & RECOMMENDATIONS:   Impression/Recommendations:  SIRS versus sepsis. Fever at home, leukopenia with monocytic predominance. Consider viral infection. Procalcitonin elevated but no evidence of acute bacterial infection. ROS and exam benign. UA, LFTs, COVID PCR, CT C/A/P negative. Does golf weekly but no rash, thrombocytopenia, or or LFT abnormalities to suggest tickborne infection. Blood cultures pending. Spontaneously improving. Rec:  · Continue to follow closely off antibiotics  · Follow-up final blood cultures  · Follow temperatures closely  · Recheck CBC in 1 week  · Supportive care as per the primary service    History of breast cancer. Status postlumpectomy, XRT left 2017, lumpectomy right 2016. The above impression and plan was discussed in detail with the patient. If the patient remains well she would be stable from an ID perspective for discharge home later today or tomorrow. Antibiotics:  Ceftriaxone #1    Thank you for this consultation. We will follow along with you. HISTORY OF PRESENT ILLNESS:  Reason for Consult: SIRS    HPI: Mulugeta Nance is a 78 y.o. female with multiple medical problems as listed below including dementia. Brought to the ED yesterday from her SNF for fever and body aches along with nausea and headache. Upon presentation noted to be afebrile with normal HR. WBC noted to be low. Procalcitonin elevated. COVID PCR negative. Started on ceftriaxone. We are asked to comment on further evaluation and management. In performing this consult, I have reviewed prior admission and outpatient visit records in detail. REVIEW OF SYSTEMS:  Has mild headache. Denies rhinorrhea, cough, nausea, vomiting, diarrhea, dysuria, rashes. A complete system-based review of systems is otherwise negative.     PAST MEDICAL HISTORY:  Past Medical History:   Diagnosis Date   • Breast cancer ([facility])    • Dementia ([address] [facility])    • Hyperlipidemia    • Insomnia      History reviewed. No pertinent surgical history. FAMILY HISTORY:  Non-contributory    SOCIAL HISTORY:  Social History     Substance and Sexual Activity   Alcohol Use Never     Social History     Substance and Sexual Activity   Drug Use Never     Social History     Tobacco Use   Smoking Status Never   Smokeless Tobacco Never       ALLERGIES:  No Known Allergies    MEDICATIONS:  All current active medications have been reviewed. PHYSICAL EXAM:  Vitals:  Temp:  [97.2 °F (36.2 °C)-99.2 °F (37.3 °C)] 97.2 °F (36.2 °C)  HR:  [56-69] 67  Resp:  [12-20] 12  BP: (115-153)/(49-71) 120/59  SpO2:  [93 %-97 %] 96 %  Temp (24hrs), Av.1 °F (36.7 °C), Min:97.2 °F (36.2 °C), Max:99.2 °F (37.3 °C)  Current: Temperature: (!) 97.2 °F (36.2 °C)     Physical Exam:  General:  Well-nourished, well-developed, in no acute distress  Eyes:  Conjunctive clear with no hemorrhages or effusions  Oropharynx:  No ulcers, no lesions  Neck:  Supple, no lymphadenopathy  Lungs:  Normal respiratory excursion, no accessory muscle use  Cardiac:  Regular rate and rhythm, extremities well perfused  Abdomen:  Soft, non-tender, non-distended  Extremities:  No peripheral cyanosis, clubbing, or edema  Skin:  No rashes, no ulcers  Neurological:  Moves all four extremities spontaneously, sensation grossly intact    LABS, IMAGING, & OTHER STUDIES:  Lab Results:  I have personally reviewed pertinent labs.   Results from last 7 days   Lab Units 23  0523  2111   POTASSIUM mmol/L 3.7 4.3   CHLORIDE mmol/L 103 101   CO2 mmol/L 28 27   BUN mg/dL 21 19   CREATININE mg/dL 0.91 0.74   EGFR ml/min/1.73sq m 57 74   CALCIUM mg/dL 9.0 9.6   AST U/L  --  26   ALT U/L  --  17   ALK PHOS U/L  --  55     Results from last 7 days   Lab Units 23  2111   WBC Thousand/uL 2.16* 1.90*   HEMOGLOBIN g/dL 12.4 12.7   PLATELETS Thousands/uL 178 196     Results from last 7 days   Lab Units 09/23/23  2146 09/23/23 2133   BLOOD CULTURE  Received in Microbiology Lab. Culture in Progress. Received in Microbiology Lab. Culture in Progress. Imaging Studies:   I have personally reviewed pertinent imaging study reports and images in PACS. CT C/A/P no evidence for pneumonia    EKG, Pathology, and Other Studies:   I have personally reviewed pertinent reports.

## 2023-09-24 NOTE — ED NOTES
Patient transported to [facility] [address], [address] [place] [address] Street  09/24/23 1145

## 2023-09-24 NOTE — ED ATTENDING ATTESTATION
9/23/2023  IAngi MD, saw and evaluated the patient. I have discussed the patient with the resident/non-physician practitioner and agree with the resident's/non-physician practitioner's findings, Plan of Care, and MDM as documented in the resident's/non-physician practitioner's note, except where noted. All available labs and Radiology studies were reviewed. I was present for key portions of any procedure(s) performed by the resident/non-physician practitioner and I was immediately available to provide assistance. At this point I agree with the current assessment done in the Emergency Department. I have conducted an independent evaluation of this patient a history and physical is as follows:    ED Course     59-year-old female, arriving from independent living, being evaluated for 1 day history of fever associated with generalized myalgias and arthralgias and headache which is improved from previous. Patient had 1 episode of nausea and vomiting after dose of ibuprofen. She denies any abdominal pain. No diarrhea. Patient reports urinary frequency. The patient is resting comfortably on a stretcher in no acute respiratory distress. The patient appears nontoxic. HEENT reveals moist mucous membranes. Head is normocephalic and atraumatic. Conjunctiva and sclera are normal. Neck is nontender and supple with full range of motion to flexion, extension, lateral rotation. No meningismus appreciated. No masses are appreciated. Lungs are clear to auscultation bilaterally without any wheezes, rales or rhonchi. Heart is regular rate and rhythm without any murmurs, rubs or gallops. Abdomen is soft and nontender without any rebound or guarding. Extremities appear grossly normal without any significant arthropathy. Patient is awake, alert, and oriented x3. The patient has normal interaction. Cranial nerves grossly intact. Motor is 5 out of 5 bilateral upper and lower extremities.     MEDICAL DECISION MAKING    Number and Complexity of Problems  • Differential diagnosis: Fever of unknown etiology: Pneumonia, COVID, urinary tract infection, viral illness. Medical Decision Making Data  • External documents reviewed: Reviewed last office visit with primary care physician/urgent care where the patient was seen on 6/9/2023 for urinary frequency and burning. Patient was treated with ciprofloxacin. No urine culture results are available from that visit. • My EKG interpretation: Normal sinus rhythm at 68 bpm.  No acute ST or T wave changes. • My CT interpretation: ***  • My X-ray interpretation: ***  • My ultrasound interpretation: ***    XR chest 2 views    (Results Pending)       Labs Reviewed   CBC AND DIFFERENTIAL - Abnormal       Result Value Ref Range Status    WBC 1.90 (*) 4.31 - 10.16 Thousand/uL Preliminary    RBC 4.22  3.81 - 5.12 Million/uL Preliminary    Hemoglobin 12.7  11.5 - 15.4 g/dL Preliminary    Hematocrit 38.6  34.8 - 46.1 % Preliminary    MCV 92  82 - 98 fL Preliminary    MCH 30.1  26.8 - 34.3 pg Preliminary    MCHC 32.9  31.4 - 37.4 g/dL Preliminary    RDW 12.6  11.6 - 15.1 % Preliminary    MPV 9.3  8.9 - 12.7 fL Preliminary    Platelets 569  979 - 390 Thousands/uL Preliminary    nRBC 0  /100 WBCs Preliminary   COMPREHENSIVE METABOLIC PANEL - Abnormal    Sodium 134 (*) 135 - 147 mmol/L Final    Potassium 4.3  3.5 - 5.3 mmol/L Final    Chloride 101  96 - 108 mmol/L Final    CO2 27  21 - 32 mmol/L Final    ANION GAP 6  mmol/L Final    BUN 19  5 - 25 mg/dL Final    Creatinine 0.74  0.60 - 1.30 mg/dL Final    Comment: Standardized to IDMS reference method    Glucose 121  65 - 140 mg/dL Final    Comment: If the patient is fasting, the ADA then defines impaired fasting glucose as > 100 mg/dL and diabetes as > or equal to 123 mg/dL.     Calcium 9.6  8.4 - 10.2 mg/dL Final    AST 26  13 - 39 U/L Final    ALT 17  7 - 52 U/L Final    Comment: Specimen collection should occur prior to Sulfasalazine administration due to the potential for falsely depressed results. Alkaline Phosphatase 55  34 - 104 U/L Final    Total Protein 6.9  6.4 - 8.4 g/dL Final    Albumin 3.9  3.5 - 5.0 g/dL Final    Total Bilirubin 0.44  0.20 - 1.00 mg/dL Final    Comment: Use of this assay is not recommended for patients undergoing treatment with eltrombopag due to the potential for falsely elevated results. N-acetyl-p-benzoquinone imine (metabolite of Acetaminophen) will generate erroneously low results in samples for patients that have taken an overdose of Acetaminophen. eGFR 74  ml/min/1.73sq m Final    Narrative:     [place] guidelines for Chronic Kidney Disease (CKD):   •  Stage 1 with normal or high GFR (GFR > 90 mL/min/1.73 square meters)  •  Stage 2 Mild CKD (GFR = 60-89 mL/min/1.73 square meters)  •  Stage 3A Moderate CKD (GFR = 45-59 mL/min/1.73 square meters)  •  Stage 3B Moderate CKD (GFR = 30-44 mL/min/1.73 square meters)  •  Stage 4 Severe CKD (GFR = 15-29 mL/min/1.73 square meters)  •  Stage 5 End Stage CKD (GFR <15 mL/min/1.73 square meters)  Note: GFR calculation is accurate only with a steady state creatinine   [facility]   BLOOD CULTURE   BLOOD CULTURE   LACTIC ACID, PLASMA (W/REFLEX IF RESULT > 2.0)   PROTIME-INR   APTT   PROCALCITONIN TEST       • Labs reviewed by me are significant for:   White blood cell count of 1.9.    • Clinical decision rules/scores are significant for: ***    • Discussed case with: ***  • Considered admission for: ***    Treatment and Disposition  ED course: ***  Shared decision making: ***  Code status: ***              Critical Care Time  Procedures Acetaminophen. eGFR 74  ml/min/1.73sq m Final    Narrative:     [facility] (CKD):   •  Stage 1 with normal or high GFR (GFR > 90 mL/min/1.73 square meters)  •  Stage 2 Mild CKD (GFR = 60-89 mL/min/1.73 square meters)  •  Stage 3A Moderate CKD (GFR = 45-59 mL/min/1.73 square meters)  •  Stage 3B Moderate CKD (GFR = 30-44 mL/min/1.73 square meters)  •  Stage 4 Severe CKD (GFR = 15-29 mL/min/1.73 square meters)  •  Stage 5 End Stage CKD (GFR <15 mL/min/1.73 square meters)  Note: GFR calculation is accurate only with a steady state creatinine   PROCALCITONIN TEST - Abnormal    Procalcitonin 8.03 (*) <=0.25 ng/ml Final    Comment: Suspected Lower Respiratory Tract Infection (LRTI):  - LESS than or EQUAL to 0.25 ng/mL:   low likelihood for bacterial LRTI; antibiotics DISCOURAGED.  - GREATER than 0.25 ng/mL:   increased likelihood for bacterial LRTI; antibiotics ENCOURAGED. Suspected Sepsis:  - Strongly consider initiating antibiotics in ALL UNSTABLE patients. - LESS than or EQUAL to 0.5 ng/mL:   low likelihood for bacterial sepsis; antibiotics DISCOURAGED.  - GREATER than 0.5 ng/mL:   increased likelihood for bacterial sepsis; antibiotics ENCOURAGED.  - GREATER than 2 ng/mL:   high risk for severe sepsis / septic shock; antibiotics strongly ENCOURAGED. Decisions on antibiotic use should not be based solely on Procalcitonin (PCT) levels. If PCT is low but uncertainty exists with stopping antibiotics, repeat PCT in 6-24 hours to confirm the low level. If antibiotics are administered (regardless if initial PCT was high or low), repeat PCT every 1-2 days to consider early antibiotic cessation (when GREATER than 80% decrease from the peak OR when PCT drops below designated cutoffs, whichever comes first), so long as the infection is NOT one that typically requires prolonged treatment durations (e.g., bone/joint infections, endocarditis, Staph.  aureus bacteremia).     Situations of FALSE-POSITIVE Procalcitonin values:  1) Newborns < 67 hours old  2) Massive stress from severe trauma / burns, major surgery, acute pancreatitis, cardiogenic / hemorrhagic shock, sickle cell crisis, or other organ perfusion abnormalities  3) Malaria and some Candidal infections  4) Treatment with agents that stimulate cytokines (e.g., OKT3, anti-lymphocyte globulins, alemtuzumab, IL-2, granulocyte transfusion [NOT GCSFs])  5) Chronic renal disease causes elevated baseline levels (consider GREATER than 0.75 ng/mL as an abnormal cut-off); initiating HD/CRRT may cause transient decreases  6) Paraneoplastic syndromes from medullary thyroid or SCLC, some forms of vasculitis, and acute [id number] disease    Situations of FALSE-NEGATIVE Procalcitonin values:  1) Too early in clinical course for PCT to have reached its peak (may repeat in 6-24 hours to confirm low level)  2) Localized infection WITHOUT systemic (SIRS / sepsis) response (e.g., an abscess, osteomyelitis, cystitis)  3) Mycobacteria (e.g., Tuberculosis, MAC)  4) Cystic fibrosis exacerbations     URINE MICROSCOPIC - Abnormal    RBC, UA 10-20 (*) None Seen, 1-2 /hpf Final    WBC, UA 1-2  None Seen, 1-2 /hpf Final    Epithelial Cells Occasional  None Seen, Occasional /hpf Final    Bacteria, UA Occasional  None Seen, Occasional /hpf Final    MUCUS THREADS Occasional (*) None Seen Final    Amorphous Crystals, UA Occasional   Final   SEDIMENTATION RATE - Abnormal    Sed Rate 31 (*) 0 - 29 mm/hour Final   C-REACTIVE PROTEIN - Abnormal    CRP 52.4 (*) <3.0 mg/L Final   URINE MACROSCOPIC, POC - Abnormal    Color, UA Yellow   Final    Clarity, UA Cloudy   Final    pH, UA 7.0  4.5 - 8.0 Final    Leukocytes, UA Negative  Negative Final    Nitrite, UA Negative  Negative Final    Protein, UA 30 (1+) (*) Negative mg/dl Final    Glucose, UA Negative  Negative mg/dl Final    Ketones, UA Negative  Negative mg/dl Final    Urobilinogen, UA 0.2 0. 2, 1.0 E.U./dl E.U./dl Final    Bilirubin, UA Negative  Negative Final    Occult Blood, UA Large (*) Negative Final    Specific Gravity, UA 1.020  1.003 - 1.030 Final    Narrative:     CLINITEK RESULT   NOVEL CORONAVIRUS (COVID-19), PCR [facility] - Normal    SARS-CoV-2 Negative  Negative Final    Comment:      Narrative:     FOR PEDIATRIC PATIENTS - copy/paste COVID Guidelines URL to browser: [email or web address]    SARS-CoV-2 assay is a Nucleic Acid Amplification assay intended for the  qualitative detection of nucleic acid from SARS-CoV-2 in nasopharyngeal  swabs. Results are for the presumptive identification of SARS-CoV-2 RNA. Positive results are indicative of infection with SARS-CoV-2, the virus  causing COVID-19, but do not rule out bacterial infection or co-infection  with other viruses. Laboratories within the [facility] and its  territories are required to report all positive results to the appropriate  public health authorities. Negative results do not preclude SARS-CoV-2  infection and should not be used as the sole basis for treatment or other  patient management decisions. Negative results must be combined with  clinical observations, patient history, and epidemiological information. This test has not been FDA cleared or approved. This test has been authorized by FDA under an Emergency Use Authorization  (EUA). This test is only authorized for the duration of time the  declaration that circumstances exist justifying the authorization of the  emergency use of an in vitro diagnostic tests for detection of SARS-CoV-2  virus and/or diagnosis of COVID-19 infection under section 564(b)(1) of  the Act, 21 U. S.C. 215UMC-5(Q)(4), unless the authorization is terminated  or revoked sooner. The test has been validated but independent review by FDA  and CLIA is pending. Test performed using [employer]:  This RT-PCR assay targets N2,  a region unique to SARS-CoV-2. A conserved region in the E-gene was chosen  for pan-Sarbecovirus detection which includes SARS-CoV-2. According to CMS-2020-01-R, this platform meets the definition of high-throughput technology. LACTIC ACID, PLASMA (W/REFLEX IF RESULT > 2.0) - Normal    LACTIC ACID 0.9  0.5 - 2.0 mmol/L Final    Narrative:     Result may be elevated if tourniquet was used during collection. PROTIME-INR - Normal    Protime 14.2  11.6 - 14.5 seconds Final    INR 1.08  0.84 - 1.19 Final   APTT - Normal    PTT 27  23 - 37 seconds Final    Comment: Therapeutic Heparin Range =  60-90 seconds       • Labs reviewed by me are significant for: White blood cell count of 1.9.    • Discussed case with: Admitting hospitalist.  • Considered admission for: Fever. Treatment and Disposition  ED course: Pt. Underwent evaluation for source of fever. Given low WBC, covered empirically with antibiotics and admitted for further identification of fever source. Shared decision making: Agreeable with plan. Code status: Full code.               Critical Care Time  Procedures

## 2023-09-24 NOTE — H&P
[address] [facility]  H&P  Name: Ne Mohr 78 y.o. female I MRN: [id number]  Unit/Bed#: ED 25 I Date of Admission: 9/23/2023   Date of Service: 9/24/2023 I Hospital Day: 0      Assessment/Plan   * SIRS vs sepsis [facility])  Assessment & Plan  · Leukopenia and reported prehospital fever to 102 °F, additionally with elevated procalcitonin 8.03 noted  · Potential infectious source unclear at this time: 6 heart wet read without acute cardiopulmonary disease visualized, COVID-19 PCR negative, UA not consistent with UTI.  ESR/CRP elevations 31/52.4 noted  · Consideration for tickborne disease given outdoor activity of golfing versus bacteremia not identified versus viral illness  · Received dose of ceftriaxone during ED evaluation, observe off further antibiotics unless patient deteriorates pending the results of blood cultures x2 and Lyme testing  · Follow-up results of CT chest/abdomen/pelvis  · Trend procalcitonin, WBC, temperature curve, hemodynamics  · Supportive care otherwise as appropriate    Leukopenia  Assessment & Plan  · Leukopenia with mild neutropenia ANC 1350, patient additionally reporting fever prior to hospital  · No clear source of infection at this time, plan as per primary problem. Monitor counts with CBC for now, can consider further noninfectious work-up if no infectious source identified and leukopenia should persist    Breast cancer, stage 1, estrogen receptor negative, right   Assessment & Plan  · With remote history of resection and XRT    Insomnia  Assessment & Plan  · PDMP reviewed, continue Ambien 5 mg nightly as needed         VTE Prophylaxis: Enoxaparin (Lovenox)  / sequential compression device   Code Status: DNR/DNI per patient  POLST: POLST form is not discussed and not completed at this time.   Discussion with family: Patient declines at this time    Anticipated Length of Stay:  Patient will be admitted on an Inpatient basis with an anticipated length of stay of greater than 2 midnights. Justification for Hospital Stay: Please see detailed plans noted above. Chief Complaint:     Fever and joint pain  History of Present Illness:  Lonney Rinne is a 78 y.o. female who has a past medical history significant for remote history of breast cancer s/p resection and XRT, insomnia on Ambien presenting with fevers and arthralgias. Patient was in her normal state of health until the date of presentation when she woke with diffuse arthralgias throughout the bilateral upper and lower extremities without isolated joint pain. She attempted to take a dose of Advil for the pain but had episode of vomiting, later in the day developing reported fever to 102.3 °F which prompted presentation here. She denied known sick contacts, chest pain/pressure, shortness of breath, abdominal pain or diarrhea, dysuria, rashes/lesions, or headache. States that she is an avid golfer, but denied any known tick bites or presence of ticks. During ED evaluation septic work-up was initiated with labs revealing leukopenia to 1.9 and elevated procalcitonin to 8.03. Blood work-up for source was initiated with CXR to wet read without acute cardiopulmonary disease visualized pending final read, UA not consistent with UTI, and COVID-19 PCR negative; blood cultures x2 and Lyme testing were obtained and are pending results. Given the presenting scenario and lab abnormalities she is admitted for further work-up and management. Currently she is lying in bed in no acute distress and comfortable appearing, offering no acute complaints at this time but states she does feel little cold. States that she is grumpy about needing to stay as she had a dental appointment scheduled for 9/25/2023 but is agreeable to remain for now; she denies any dental pain or discharge and states the appointment was for a filling.     Review of Systems:    Constitutional: Reported fever and chills  Eyes:  Denies change in visual acuity HENT:  Denies nasal congestion or sore throat   Respiratory:  Denies cough or shortness of breath   Cardiovascular:  Denies chest pain or edema   GI:  Denies abdominal pain, bloody stools or diarrhea but reported episode of nausea and vomiting  :  Denies dysuria   Musculoskeletal:  Denies back pain or joint pain but report arthralgias and myalgias  Integument:  Denies rash   Neurologic:  Denies headache, focal weakness or sensory changes   Endocrine:  Denies polyuria or polydipsia   Lymphatic:  Denies swollen glands   Psychiatric:  Denies depression or anxiety     Past Medical and Surgical History:   Past Medical History:   Diagnosis Date   • Breast cancer ([facility])    • Dementia ([facility])    • Hyperlipidemia    • Insomnia      History reviewed. No pertinent surgical history. Meds/Allergies:  No current facility-administered medications for this encounter.     Current Outpatient Medications:   •  zolpidem (AMBIEN) 5 mg tablet, Take 5 mg by mouth daily as needed, Disp: , Rfl:   •  Biotin 1000 MCG tablet, Take 1,000 mg by mouth, Disp: , Rfl:   •  calcium carbonate (OS-FATUMA) 600 MG tablet, Take 1 tablet by mouth every morning, Disp: , Rfl:   •  Cholecalciferol 50 MCG (2000 UT) CAPS, Take 1 capsule by mouth every morning, Disp: , Rfl:   •  vitamin A 3 MG (05268 UT) capsule, Take 10,000 Units by mouth, Disp: , Rfl:     Allergies: No Known Allergies  History:  Marital Status: /Civil Union     Substance Use History:   Social History     Substance and Sexual Activity   Alcohol Use None     Social History     Tobacco Use   Smoking Status Not on file   Smokeless Tobacco Not on file     Social History     Substance and Sexual Activity   Drug Use Not on file       Family History:  Noncontributory  Physical Exam:     Vitals:   Blood Pressure: 139/62 (09/23/23 2200)  Pulse: 56 (09/24/23 0030)  Temperature: 99.2 °F (37.3 °C) (09/23/23 2054)  Temp Source: Oral (09/23/23 2054)  Respirations: 18 (09/24/23 0030)  SpO2: 93 % (09/24/23 0030)    Constitutional:  Well developed, well nourished, no acute distress, non-toxic appearance   Eyes:  PERRL, conjunctiva normal   HENT:  Atraumatic, external ears normal, nose normal, oropharynx moist, no pharyngeal exudates. Neck- normal range of motion, no tenderness, supple   Respiratory:  No respiratory distress, normal breath sounds, no rales, no wheezing   Cardiovascular:  Normal rate, normal rhythm, no murmurs, no gallops, no rubs   GI:  Soft, nondistended, normal bowel sounds, nontender, no organomegaly, no mass, no rebound, no guarding   :  No costovertebral angle tenderness   Musculoskeletal:  No edema, no tenderness, no deformities. Back- no tenderness  Integument:  Well hydrated, no rash   Lymphatic:  No lymphadenopathy noted   Neurologic:  Alert &awake, communicative, CN 2-12 normal, normal motor function, normal sensory function, no focal deficits noted   Psychiatric:  Speech and behavior appropriate       Lab Results: I have personally reviewed pertinent reports. Results from last 7 days   Lab Units 09/23/23  2111   WBC Thousand/uL 1.90*   HEMOGLOBIN g/dL 12.7   HEMATOCRIT % 38.6   PLATELETS Thousands/uL 196   NEUTROS PCT % 70   LYMPHS PCT % 13*   MONOS PCT % 13*   EOS PCT % 2     Results from last 7 days   Lab Units 09/23/23  2111   POTASSIUM mmol/L 4.3   CHLORIDE mmol/L 101   CO2 mmol/L 27   BUN mg/dL 19   CREATININE mg/dL 0.74   CALCIUM mg/dL 9.6   ALK PHOS U/L 55   ALT U/L 17   AST U/L 26     Results from last 7 days   Lab Units 09/23/23  2133   INR  1.08       EKG: Normal sinus rhythm HR 68    Imaging: I have personally reviewed pertinent films in PACS    CXR: Personally reviewed, no acute cardiopulmonary disease visualized. Final radiologist read is pending. ** Please Note: Dragon 360 Dictation voice to text software was used in the creation of this document.  **

## 2023-09-25 VITALS
WEIGHT: 125.88 LBS | RESPIRATION RATE: 16 BRPM | HEIGHT: 63 IN | SYSTOLIC BLOOD PRESSURE: 151 MMHG | TEMPERATURE: 97.4 F | BODY MASS INDEX: 22.3 KG/M2 | OXYGEN SATURATION: 97 % | HEART RATE: 58 BPM | DIASTOLIC BLOOD PRESSURE: 74 MMHG

## 2023-09-25 LAB
BASOPHILS # BLD AUTO: 0.02 THOUSANDS/ÂΜL (ref 0–0.1)
BASOPHILS NFR BLD AUTO: 1 % (ref 0–1)
EOSINOPHIL # BLD AUTO: 0.22 THOUSAND/ÂΜL (ref 0–0.61)
EOSINOPHIL NFR BLD AUTO: 7 % (ref 0–6)
ERYTHROCYTE [DISTWIDTH] IN BLOOD BY AUTOMATED COUNT: 12.7 % (ref 11.6–15.1)
HCT VFR BLD AUTO: 40.1 % (ref 34.8–46.1)
HGB BLD-MCNC: 13.2 G/DL (ref 11.5–15.4)
IMM GRANULOCYTES # BLD AUTO: 0.01 THOUSAND/UL (ref 0–0.2)
IMM GRANULOCYTES NFR BLD AUTO: 0 % (ref 0–2)
LYMPHOCYTES # BLD AUTO: 1.39 THOUSANDS/ÂΜL (ref 0.6–4.47)
LYMPHOCYTES NFR BLD AUTO: 46 % (ref 14–44)
MCH RBC QN AUTO: 30.9 PG (ref 26.8–34.3)
MCHC RBC AUTO-ENTMCNC: 32.9 G/DL (ref 31.4–37.4)
MCV RBC AUTO: 94 FL (ref 82–98)
MONOCYTES # BLD AUTO: 0.67 THOUSAND/ÂΜL (ref 0.17–1.22)
MONOCYTES NFR BLD AUTO: 22 % (ref 4–12)
NEUTROPHILS # BLD AUTO: 0.72 THOUSANDS/ÂΜL (ref 1.85–7.62)
NEUTS SEG NFR BLD AUTO: 24 % (ref 43–75)
NRBC BLD AUTO-RTO: 0 /100 WBCS
PLATELET # BLD AUTO: 195 THOUSANDS/UL (ref 149–390)
PMV BLD AUTO: 9.7 FL (ref 8.9–12.7)
RBC # BLD AUTO: 4.27 MILLION/UL (ref 3.81–5.12)
WBC # BLD AUTO: 3.03 THOUSAND/UL (ref 4.31–10.16)

## 2023-09-25 PROCEDURE — 85025 COMPLETE CBC W/AUTO DIFF WBC: CPT | Performed by: INTERNAL MEDICINE

## 2023-09-25 PROCEDURE — 99239 HOSP IP/OBS DSCHRG MGMT >30: CPT | Performed by: INTERNAL MEDICINE

## 2023-09-25 NOTE — ASSESSMENT & PLAN NOTE
· Leukopenia and reported prehospital fever to 102 °F, additionally with elevated procalcitonin 8.03 noted  · work-up including COVID, flu, UA, blood cultures, Lyme antibody, CT chest abdomen pelvis were all negative for any infectious process. · SIRS has resolved. White count is improving. · Monitor off antibiotics. Appreciate infectious disease evaluation. Suspect viral illness. · Repeat CBC in 1 week.

## 2023-09-25 NOTE — DISCHARGE SUMMARY
[address] [facility]  Discharge- Galina Fontaine 1940, 78 y.o. female MRN: [id number]  Unit/Bed#: [facility] 507-01 Encounter: [id number]  Primary Care Provider: Noemi Thompson MD   Date and time admitted to hospital: 9/23/2023  8:46 PM    * SIRS (systemic inflammatory response syndrome) ([address])  Assessment & Plan  · Leukopenia and reported prehospital fever to 102 °F, additionally with elevated procalcitonin 8.03 noted  · work-up including COVID, flu, UA, blood cultures, Lyme antibody, CT chest abdomen pelvis were all negative for any infectious process. · SIRS has resolved. White count is improving. · Monitor off antibiotics. Appreciate infectious disease evaluation. Suspect viral illness. · Repeat CBC in 1 week. Leukopenia  Assessment & Plan  · imProving. Suspect due to viral illness. · Check CBC in 1 week    Breast cancer, stage 1, estrogen receptor negative, right   Assessment & Plan  · With remote history of resection and XRT    Insomnia  Assessment & Plan  · PDMP reviewed, continue Ambien 5 mg nightly as needed        Medical Problems     Resolved Problems  Date Reviewed: 9/25/2023   None       Discharging Physician / Practitioner: Abel Mohr MD  PCP: Noemi Thompson MD  Admission Date:   Admission Orders (From admission, onward)     Ordered        09/24/23 0015  INPATIENT ADMISSION  Once                      Discharge Date: 09/25/23    Consultations During Hospital Stay:  · Infectious disease  ·     Test Results Pending at Discharge (will require follow up): · None     Outpatient Tests Requested:  · CBC in 1 week    Complications: No    Reason for Admission: Fever    Hospital Course:   Galina Fontaine is a 78 y.o. female patient who originally presented to the hospital on 9/23/2023 due to viral-like illness including reported fever of 102 at home. She was noted to have leukopenia on admission. Infectious work-up as above was negative. Suspect viral illness.   Appreciate ID evaluation. Patient with no further fevers and leukopenia is improving. Stable for discharge. With PCP in 1 week with repeat CBC      Please see above list of diagnoses and related plan for additional information. Condition at Discharge: good    Discharge Day Visit / Exam:   Subjective: Doing well. No complaints  Vitals: Blood Pressure: 151/74 (09/25/23 0755)  Pulse: 58 (09/25/23 0755)  Temperature: (!) 97.4 °F (36.3 °C) (09/25/23 0755)  Temp Source: Oral (09/24/23 1520)  Respirations: 16 (09/25/23 0755)  Height: 5' 3" (160 cm) (09/24/23 0112)  Weight - Scale: 57.1 kg (125 lb 14.1 oz) (09/24/23 0112)  SpO2: 97 % (09/25/23 0755)  Exam:   Physical Exam  Vitals and nursing note reviewed. Constitutional:       General: She is not in acute distress. Appearance: She is well-developed. HENT:      Head: Normocephalic and atraumatic. Eyes:      Conjunctiva/sclera: Conjunctivae normal.   Cardiovascular:      Rate and Rhythm: Normal rate and regular rhythm. Heart sounds: No murmur heard. Pulmonary:      Effort: Pulmonary effort is normal. No respiratory distress. Breath sounds: Normal breath sounds. Abdominal:      Palpations: Abdomen is soft. Tenderness: There is no abdominal tenderness. Musculoskeletal:         General: No swelling. Cervical back: Normal range of motion and neck supple. Skin:     General: Skin is warm and dry. Capillary Refill: Capillary refill takes less than 2 seconds. Neurological:      General: No focal deficit present. Mental Status: She is alert and oriented to person, place, and time. Psychiatric:         Mood and Affect: Mood normal.         Behavior: Behavior normal.          Discussion with Family: Patient declined call to . Discharge instructions/Information to patient and family:   See after visit summary for information provided to patient and family.       Provisions for Follow-Up Care:  See after visit summary for information related to follow-up care and any pertinent home health orders. Disposition:   Home    Planned Readmission: No     Discharge Statement:  I spent 31 minutes discharging the patient. This time was spent on the day of discharge. I had direct contact with the patient on the day of discharge. Greater than 50% of the total time was spent examining patient, answering all patient questions, arranging and discussing plan of care with patient as well as directly providing post-discharge instructions. Additional time then spent on discharge activities. Discharge Medications:  See after visit summary for reconciled discharge medications provided to patient and/or family.       **Please Note: This note may have been constructed using a voice recognition system**

## 2023-09-26 NOTE — UTILIZATION REVIEW
NOTIFICATION OF ADMISSION DISCHARGE   This is a Notification of Discharge from [facility]. Please be advised that this patient has been discharge from our facility. Below you will find the admission and discharge date and time including the patient’s disposition. UTILIZATION REVIEW CONTACT:  Samm Kruger  Utilization   Network Utilization Review Department  Phone: [phone] x carefully listen to the prompts. All voicemails are confidential.  Email: [email or web address]     ADMISSION INFORMATION  PRESENTATION DATE: 9/23/2023  8:46 PM  OBERVATION ADMISSION DATE:   INPATIENT ADMISSION DATE: 9/24/23 12:15 AM   DISCHARGE DATE: 9/25/2023 10:43 AM   DISPOSITION:Home/Self Care    IMPORTANT INFORMATION:  Send all requests for admission clinical reviews, approved or denied determinations and any other requests to dedicated fax number below belonging to the campus where the patient is receiving treatment.  List of dedicated fax numbers:  [place] DENIALS (Administrative/Medical Necessity) [phone] [address] [facility] (Maternity/NICU/Pediatrics) [phone]   [facility] [phone]   [facility] [phone] [address] [facility] [phone]   [address] [place] [address] [phone]   [facility] [phone]   [address] [place] [address] [phone]   [address] [place] [address] [phone] [address] [facility] [phone] [address] [facility] [address] [phone]

## 2023-09-29 LAB
BACTERIA BLD CULT: NORMAL
BACTERIA BLD CULT: NORMAL